# Patient Record
Sex: FEMALE | Race: BLACK OR AFRICAN AMERICAN | NOT HISPANIC OR LATINO | Employment: PART TIME | URBAN - METROPOLITAN AREA
[De-identification: names, ages, dates, MRNs, and addresses within clinical notes are randomized per-mention and may not be internally consistent; named-entity substitution may affect disease eponyms.]

---

## 2021-05-27 ENCOUNTER — PATIENT OUTREACH (OUTPATIENT)
Dept: ADMINISTRATIVE | Facility: HOSPITAL | Age: 46
End: 2021-05-27

## 2021-05-27 DIAGNOSIS — Z12.31 SCREENING MAMMOGRAM, ENCOUNTER FOR: Primary | ICD-10-CM

## 2022-03-25 PROBLEM — N60.01 BENIGN CYST OF RIGHT BREAST: Status: ACTIVE | Noted: 2022-03-25

## 2022-03-25 PROBLEM — Z98.890 PERSONAL HISTORY OF BENIGN BREAST BIOPSY: Status: ACTIVE | Noted: 2022-03-25

## 2022-03-25 PROBLEM — Z80.3 FAMILY HISTORY OF BREAST CANCER: Status: ACTIVE | Noted: 2022-03-25

## 2022-03-25 PROBLEM — N64.4 BREAST PAIN, RIGHT: Status: ACTIVE | Noted: 2022-03-25

## 2022-08-31 ENCOUNTER — PATIENT MESSAGE (OUTPATIENT)
Dept: ADMINISTRATIVE | Facility: HOSPITAL | Age: 47
End: 2022-08-31

## 2022-09-02 ENCOUNTER — PATIENT MESSAGE (OUTPATIENT)
Dept: ADMINISTRATIVE | Facility: HOSPITAL | Age: 47
End: 2022-09-02

## 2023-11-14 ENCOUNTER — PATIENT MESSAGE (OUTPATIENT)
Dept: ADMINISTRATIVE | Facility: HOSPITAL | Age: 48
End: 2023-11-14

## 2023-11-27 ENCOUNTER — PATIENT OUTREACH (OUTPATIENT)
Dept: ADMINISTRATIVE | Facility: HOSPITAL | Age: 48
End: 2023-11-27

## 2024-01-08 ENCOUNTER — PATIENT MESSAGE (OUTPATIENT)
Dept: ADMINISTRATIVE | Facility: HOSPITAL | Age: 49
End: 2024-01-08

## 2024-01-08 ENCOUNTER — PATIENT OUTREACH (OUTPATIENT)
Dept: ADMINISTRATIVE | Facility: HOSPITAL | Age: 49
End: 2024-01-08

## 2024-04-03 PROBLEM — Z12.11 ENCOUNTER FOR SCREENING COLONOSCOPY: Status: ACTIVE | Noted: 2024-04-03

## 2024-04-03 PROBLEM — K57.30 DIVERTICULOSIS OF LARGE INTESTINE WITHOUT DIVERTICULITIS: Status: ACTIVE | Noted: 2024-04-03

## 2024-08-15 ENCOUNTER — NURSE TRIAGE (OUTPATIENT)
Dept: ADMINISTRATIVE | Facility: CLINIC | Age: 49
End: 2024-08-15

## 2024-08-16 NOTE — TELEPHONE ENCOUNTER
Pt states on menstrual cycle ended on Sunday and started again on Monday. Pt also states Recently sexually active and noticed bleeding after intercourse. Pt asking if this is normal. Per protocol, see pcp within 2 weeks. Discussed symptoms to monitor for. Also discussed pt going to [email or web address] to Find A Doctor to make appt. Pt verbalizes understanding and advised to call back for any further questions or concerns.   Reason for Disposition   Bleeding or spotting occurs after sex  (Exception: First intercourse.)    Additional Information   Negative: Shock suspected (e.g., cold/pale/clammy skin, too weak to stand, low BP, rapid pulse)   Negative: Difficult to awaken or acting confused (e.g., disoriented, slurred speech)   Negative: Passed out (e.g., fainted, lost consciousness, blacked out and was not responding)   Negative: Sounds like a life-threatening emergency to the triager   Negative: SEVERE abdominal pain   Negative: SEVERE dizziness (e.g., unable to stand, requires support to walk, feels like passing out now)   Negative: SEVERE vaginal bleeding (e.g., soaking 2 pads or tampons per hour and present 2 or more hours; 1 menstrual cup every 2 hours)   Negative: Patient sounds very sick or weak to the triager   Negative: MODERATE vaginal bleeding (e.g., soaking 1 pad or tampon per hour and present > 6 hours; 1 menstrual cup every 6 hours)   Negative: [1] Constant abdominal pain AND [2] present > 2 hours   Negative: Pale skin (pallor) of new-onset or getting worse   Negative: Passed tissue (e.g., gray-white)   Negative: Taking Coumadin (warfarin) or other strong blood thinner, or known bleeding disorder (e.g., thrombocytopenia)   Negative: [1] Skin bruises or nosebleed AND [2] not caused by an injury   Negative: [1] Periods with > 6 soaked pads or tampons per day AND [2] last > 7 days   Negative: [1] Bleeding or spotting after procedure (e.g., biopsy) or pelvic examination (e.g., pap smear) AND [2] lasts > 7 days    Negative: Periods with > 6 soaked pads or tampons per day   Negative: Periods last > 7 days   Negative: [1] Uses menstrual cups AND [2] more than 80 ml blood per menstrual period.   Negative: [1] Missed period AND [2] has occurred 2 or more times in the last year   Negative: [1] Menstrual cycle < 21 days OR > 35 days AND [2] occurs more than two cycles (2 months) this past year   Negative: [1] Bleeding or spotting between regular periods AND [2] occurs more than three cycles (3 months) this past year   Negative: [1] Bleeding or spotting between regular periods AND [2] occurs more than three cycles (3 months) AND [3] using birth control medicine (pills, patch, Depo-Provera, Implanon, vaginal ring, Mirena IUD)   Negative: Bleeding or spotting occurs after hysterectomy    Protocols used: Vaginal Bleeding - [id number]

## 2024-09-30 PROBLEM — B00.9 HERPETIC LESIONS: Status: ACTIVE | Noted: 2024-09-30

## 2024-09-30 PROBLEM — N93.0 POSTCOITAL BLEEDING: Status: ACTIVE | Noted: 2024-09-30

## 2024-09-30 PROBLEM — Z01.419 WELL WOMAN EXAM WITH ROUTINE GYNECOLOGICAL EXAM: Status: ACTIVE | Noted: 2024-04-03

## 2024-09-30 PROBLEM — Z11.3 SCREENING EXAMINATION FOR STI: Status: ACTIVE | Noted: 2024-04-03

## 2024-09-30 PROBLEM — N92.1 METRORRHAGIA: Status: ACTIVE | Noted: 2024-09-30

## 2024-11-27 ENCOUNTER — PATIENT OUTREACH (OUTPATIENT)
Dept: ADMINISTRATIVE | Facility: HOSPITAL | Age: 49
End: 2024-11-27